# Patient Record
Sex: MALE | Race: WHITE | URBAN - METROPOLITAN AREA
[De-identification: names, ages, dates, MRNs, and addresses within clinical notes are randomized per-mention and may not be internally consistent; named-entity substitution may affect disease eponyms.]

---

## 2019-05-08 ENCOUNTER — OFFICE VISIT (OUTPATIENT)
Dept: FAMILY MEDICINE CLINIC | Age: 49
End: 2019-05-08

## 2019-05-08 VITALS
BODY MASS INDEX: 34.69 KG/M2 | HEIGHT: 74 IN | TEMPERATURE: 97.8 F | HEART RATE: 92 BPM | OXYGEN SATURATION: 97 % | WEIGHT: 270.3 LBS | SYSTOLIC BLOOD PRESSURE: 127 MMHG | DIASTOLIC BLOOD PRESSURE: 84 MMHG

## 2019-05-08 DIAGNOSIS — J02.9 SORE THROAT: Primary | ICD-10-CM

## 2019-05-08 DIAGNOSIS — H66.004 RECURRENT ACUTE SUPPURATIVE OTITIS MEDIA OF RIGHT EAR WITHOUT SPONTANEOUS RUPTURE OF TYMPANIC MEMBRANE: ICD-10-CM

## 2019-05-08 LAB — S PYO AG THROAT QL: NORMAL

## 2019-05-08 PROCEDURE — 99213 OFFICE O/P EST LOW 20 MIN: CPT | Performed by: NURSE PRACTITIONER

## 2019-05-08 PROCEDURE — 96372 THER/PROPH/DIAG INJ SC/IM: CPT | Performed by: NURSE PRACTITIONER

## 2019-05-08 PROCEDURE — 87880 STREP A ASSAY W/OPTIC: CPT | Performed by: NURSE PRACTITIONER

## 2019-05-08 RX ORDER — CEFTRIAXONE 500 MG/1
500 INJECTION, POWDER, FOR SOLUTION INTRAMUSCULAR; INTRAVENOUS ONCE
Status: COMPLETED | OUTPATIENT
Start: 2019-05-08 | End: 2019-05-08

## 2019-05-08 RX ORDER — AMOXICILLIN AND CLAVULANATE POTASSIUM 875; 125 MG/1; MG/1
1 TABLET, FILM COATED ORAL 2 TIMES DAILY
Qty: 20 TABLET | Refills: 0 | Status: SHIPPED | OUTPATIENT
Start: 2019-05-08 | End: 2019-05-18

## 2019-05-08 RX ADMIN — CEFTRIAXONE 500 MG: 500 INJECTION, POWDER, FOR SOLUTION INTRAMUSCULAR; INTRAVENOUS at 09:49

## 2019-05-08 SDOH — HEALTH STABILITY: MENTAL HEALTH: HOW OFTEN DO YOU HAVE A DRINK CONTAINING ALCOHOL?: 2-3 TIMES A WEEK

## 2019-05-08 SDOH — HEALTH STABILITY: MENTAL HEALTH: HOW MANY STANDARD DRINKS CONTAINING ALCOHOL DO YOU HAVE ON A TYPICAL DAY?: 3 OR 4

## 2019-05-08 ASSESSMENT — ENCOUNTER SYMPTOMS
VISUAL CHANGE: 0
ALLERGIC/IMMUNOLOGIC NEGATIVE: 1
ABDOMINAL PAIN: 0
SORE THROAT: 1
EYE ITCHING: 0
SINUS PAIN: 1
CHEST TIGHTNESS: 0
EYE DISCHARGE: 0
SWOLLEN GLANDS: 0
DIARRHEA: 0
HOARSE VOICE: 0
RHINORRHEA: 0
SHORTNESS OF BREATH: 0
COUGH: 0
SINUS PRESSURE: 1
NAUSEA: 0
VOMITING: 0
CHANGE IN BOWEL HABIT: 0
FACIAL SWELLING: 0
EYES NEGATIVE: 1

## 2019-05-08 ASSESSMENT — PATIENT HEALTH QUESTIONNAIRE - PHQ9
2. FEELING DOWN, DEPRESSED OR HOPELESS: 0
SUM OF ALL RESPONSES TO PHQ9 QUESTIONS 1 & 2: 0
SUM OF ALL RESPONSES TO PHQ QUESTIONS 1-9: 0
SUM OF ALL RESPONSES TO PHQ QUESTIONS 1-9: 0
1. LITTLE INTEREST OR PLEASURE IN DOING THINGS: 0

## 2019-05-08 NOTE — PROGRESS NOTES
[facility] [id number]  [address] [place] [id number]  Dept: [phone]  Dept Fax: [phone]    Pasha Maki is a 44 y.o. male who presents today forhis medical conditions/complaints as noted below. Pasha Maki is c/o of   No chief complaint on file. HPI:     Pharyngitis   This is a new problem. The current episode started 1 to 4 weeks ago (x's 6 days ). The problem occurs intermittently. The problem has been gradually worsening. Associated symptoms include chills, congestion, a fever and a sore throat. Pertinent negatives include no abdominal pain, anorexia, arthralgias, change in bowel habit, chest pain, coughing, diaphoresis, fatigue, headaches, joint swelling, myalgias, nausea, neck pain, numbness, rash, swollen glands, urinary symptoms, vertigo, visual change, vomiting or weakness. Associated symptoms comments: R ear ringing and R ear pain   . The symptoms are aggravated by swallowing. He has tried acetaminophen and NSAIDs for the symptoms. The treatment provided mild relief. Sinusitis   This is a new problem. The current episode started 1 to 4 weeks ago. The problem has been gradually worsening since onset. There has been no fever. His pain is at a severity of 4/10. The pain is mild. Associated symptoms include chills, congestion, sinus pressure and a sore throat. Pertinent negatives include no coughing, diaphoresis, ear pain, headaches, hoarse voice, neck pain, shortness of breath, sneezing or swollen glands. Past treatments include nothing. The treatment provided no relief. History reviewed. No pertinent past medical history. Past Surgical History:   Procedure Laterality Date    CYST REMOVAL  1982       History reviewed. No pertinent family history.     Social History     Tobacco Use    Smoking status: Current Some Day Smoker     Types: Cigars    Smokeless tobacco: Never Used   Substance Use Topics    Alcohol use: Yes     Frequency: 2-3 times a week     Drinks per session: 3 or 4     Binge frequency: Never      Current Outpatient Medications   Medication Sig Dispense Refill    amoxicillin-clavulanate (AUGMENTIN) 875-125 MG per tablet Take 1 tablet by mouth 2 times daily for 10 days 20 tablet 0     No current facility-administered medications for this visit. No Known Allergies        Subjective:      Review of Systems   Constitutional: Positive for chills and fever. Negative for appetite change, diaphoresis and fatigue. HENT: Positive for congestion, postnasal drip, sinus pressure, sinus pain and sore throat. Negative for ear discharge, ear pain, facial swelling, hearing loss, hoarse voice, mouth sores, nosebleeds, rhinorrhea, sneezing and tinnitus. Eyes: Negative. Negative for discharge and itching. Respiratory: Negative for cough, chest tightness and shortness of breath. Cardiovascular: Negative for chest pain, palpitations and leg swelling. Gastrointestinal: Negative for abdominal pain, anorexia, change in bowel habit, diarrhea, nausea and vomiting. Endocrine: Negative. Genitourinary: Negative for dysuria, frequency and urgency. Musculoskeletal: Negative for arthralgias, joint swelling, myalgias, neck pain and neck stiffness. Skin: Negative for rash. Allergic/Immunologic: Negative. Neurological: Negative for dizziness, vertigo, weakness, light-headedness, numbness and headaches. Hematological: Negative for adenopathy. Psychiatric/Behavioral: Negative for confusion. Objective:      Physical Exam   Constitutional: He is oriented to person, place, and time. Vital signs are normal. He appears well-developed and well-nourished. HENT:   Head: Normocephalic. Right Ear: External ear normal. Tympanic membrane is erythematous and bulging. A middle ear effusion is present. Left Ear: External ear normal. A middle ear effusion is present. Nose: Right sinus exhibits frontal sinus tenderness.  Left sinus exhibits frontal sinus tenderness. Mouth/Throat: Posterior oropharyngeal erythema present. Eyes: Pupils are equal, round, and reactive to light. Conjunctivae and EOM are normal.   Neck: Normal range of motion. Neck supple. Cardiovascular: Normal rate, regular rhythm, S1 normal, S2 normal and normal heart sounds. Exam reveals no gallop and no friction rub. No murmur heard. Pulmonary/Chest: Effort normal and breath sounds normal. No stridor. No respiratory distress. He has no decreased breath sounds. He has no wheezes. He has no rhonchi. He has no rales. Abdominal: Soft. Bowel sounds are normal.   Musculoskeletal: Normal range of motion. Lymphadenopathy:     He has no cervical adenopathy. Neurological: He is alert and oriented to person, place, and time. He has normal reflexes. No cranial nerve deficit. Coordination normal.   Skin: Skin is warm and dry. No rash noted. Psychiatric: He has a normal mood and affect. Thought content normal.   Vitals reviewed. /84   Pulse 92   Temp 97.8 °F (36.6 °C) (Oral)   Ht 6' 2\" (1.88 m)   Wt 270 lb 4.8 oz (122.6 kg)   SpO2 97%   BMI 34.70 kg/m²     Results for POC orders placed in visit on 05/08/19   POCT rapid strep A   Result Value Ref Range    Strep A Ag None Detected None Detected         Assessment:       Diagnosis Orders   1. Sore throat  POCT rapid strep A    amoxicillin-clavulanate (AUGMENTIN) 875-125 MG per tablet   2. Recurrent acute suppurative otitis media of right ear without spontaneous rupture of tympanic membrane  amoxicillin-clavulanate (AUGMENTIN) 875-125 MG per tablet    cefTRIAXone (ROCEPHIN) injection 500 mg             Plan:     1.) POCT Strep- negative   2.) Start Augmentin   3.) Flonase PRN- patient declines  4.) Claritin-D PRN- patient declines   5.) Follow-up with PCP     Problem List     None           Patient given educationalmaterials - see patient instructions.   Discussed use, benefit, and side effectsof prescribed medications. All patient questions answered. Pt verbalized understanding. Instructed to continue current medications, diet and exercise. Patient agreedwith treatment plan. Follow up as directed.      Electronically signed by PATRICIA Winn CNP on 5/8/2019 at 10:53 AM